# Patient Record
Sex: FEMALE | Race: WHITE
[De-identification: names, ages, dates, MRNs, and addresses within clinical notes are randomized per-mention and may not be internally consistent; named-entity substitution may affect disease eponyms.]

---

## 2023-07-04 ENCOUNTER — HOSPITAL ENCOUNTER (OUTPATIENT)
Dept: HOSPITAL 93 - CIR.AMB | Age: 28
Discharge: HOME | End: 2023-07-04
Attending: SPECIALIST
Payer: COMMERCIAL

## 2023-07-04 VITALS — WEIGHT: 100 LBS | BODY MASS INDEX: 17.07 KG/M2 | HEIGHT: 64 IN

## 2023-07-04 DIAGNOSIS — Z20.822: ICD-10-CM

## 2023-07-04 DIAGNOSIS — O03.4: Primary | ICD-10-CM

## 2023-07-04 NOTE — NUR
SHANNON RAMIREZ ORIENTA A PTE SOBRE TRATAMIENTO E INSTRUCCIONES A SEGUIR, ILDA
REFIERE ENTENDER. LE COLECTA MUESTRAS, SE CANALIZA Y SE ADMINISTRA MEDICAMENTO
MP ORDEN MEDICA.

## 2023-07-04 NOTE — NUR
SE ASISTE AL DOCTOR EN PROCESO DE EXAMEN VAGINAL, DOCTOR MARCO EVALUA Y
ATIENDE A PACIENTE EN EL AREA.